# Patient Record
Sex: FEMALE | Race: WHITE | NOT HISPANIC OR LATINO | Employment: FULL TIME | URBAN - METROPOLITAN AREA
[De-identification: names, ages, dates, MRNs, and addresses within clinical notes are randomized per-mention and may not be internally consistent; named-entity substitution may affect disease eponyms.]

---

## 2019-05-06 ENCOUNTER — OFFICE VISIT (OUTPATIENT)
Dept: OBSTETRICS AND GYNECOLOGY | Facility: CLINIC | Age: 34
End: 2019-05-06

## 2019-05-06 DIAGNOSIS — R10.2 PELVIC PAIN IN FEMALE: ICD-10-CM

## 2019-05-06 DIAGNOSIS — N92.6 IRREGULAR MENSES: Primary | ICD-10-CM

## 2019-05-06 LAB
B-HCG UR QL: NEGATIVE
INTERNAL NEGATIVE CONTROL: NEGATIVE
INTERNAL POSITIVE CONTROL: POSITIVE
Lab: NORMAL

## 2019-05-06 PROCEDURE — 81025 URINE PREGNANCY TEST: CPT | Performed by: OBSTETRICS & GYNECOLOGY

## 2019-05-06 PROCEDURE — 99214 OFFICE O/P EST MOD 30 MIN: CPT | Performed by: OBSTETRICS & GYNECOLOGY

## 2019-05-06 RX ORDER — CETIRIZINE HYDROCHLORIDE 10 MG/1
10 TABLET ORAL DAILY
COMMUNITY
Start: 2018-04-08

## 2019-05-07 NOTE — PROGRESS NOTES
"Subjective   Meenu Kelly is a 34 y.o. female.     Cc:  Irregular period and cramping    History of Present Illness - Patient is a 34 year old multigravid female with irregular bleeding over the past cycle;  She had a 5 day cycle, stopped for one week and then bled for 3 more days.  The second bleeding episode was bright red and not characteristic of her normal cycle.  She was concerned because a family member of hers had \"cervical cancer.\"  She reports having a history of normal pap testing.  She has not had a work up for this issue.  She is not on any hormonal medications.    The following portions of the patient's history were reviewed and updated as appropriate:   She  has a past medical history of Asthma and Gestational diabetes.  She  has a past surgical history that includes [place] tooth extraction.  Her family history is not on file.  She  reports that she quit smoking about 5 years ago. She does not have any smokeless tobacco history on file. She reports that she does not drink alcohol or use drugs.  Current Outpatient Medications   Medication Sig Dispense Refill   • cetirizine (ZYRTEC ALLERGY) 10 MG tablet Take 10 mg by mouth Daily.       No current facility-administered medications for this visit.      She has No Known Allergies..    Review of Systems   Constitutional: Negative for chills and fever.   Genitourinary: Positive for menstrual problem and vaginal bleeding. Negative for pelvic pain.       Objective   Physical Exam   Constitutional: She appears well-developed and well-nourished.   Abdominal: Soft. There is no tenderness. There is no rebound and no guarding.   Genitourinary: Vagina normal and uterus normal. Pelvic exam was performed with patient supine. There is no tenderness or lesion on the right labia. There is no tenderness or lesion on the left labia. Cervix exhibits no motion tenderness and no friability. Right adnexum displays no mass and no tenderness. Left adnexum displays no mass and " no tenderness.   Neurological: She is alert. Coordination normal.   Skin: Skin is warm and dry.   Psychiatric: She has a normal mood and affect. Her behavior is normal. Judgment and thought content normal.   Vitals reviewed.      Assessment/Plan   Meenu was seen today for menstrual problem.    Diagnoses and all orders for this visit:    Irregular menses  -     POC Pregnancy, Urine  -     Patient with normal exam.  For now, would observe.  If recurrent pattern of abnormal bleeding occurs, I would consider work up, including sonogram and labs.      Pelvic pain in female  -     Urine Culture - Urine, Urine, Clean Catch  -     NuSwab VG+ - Swab, Vagina  -     Check for signs of UTI or infection and treat if appropriate.    I spent 25 minutes with patient and greater than 15 minutes in counseling face to face on above issues.    Roc Galicia MD

## 2019-05-08 VITALS
WEIGHT: 167 LBS | BODY MASS INDEX: 28.51 KG/M2 | HEIGHT: 64 IN | SYSTOLIC BLOOD PRESSURE: 129 MMHG | HEART RATE: 70 BPM | DIASTOLIC BLOOD PRESSURE: 78 MMHG

## 2019-05-09 LAB
BACTERIA UR CULT: NORMAL
BACTERIA UR CULT: NORMAL

## 2019-05-10 ENCOUNTER — TELEPHONE (OUTPATIENT)
Dept: OBSTETRICS AND GYNECOLOGY | Facility: CLINIC | Age: 34
End: 2019-05-10

## 2019-05-10 LAB
A VAGINAE DNA VAG QL NAA+PROBE: ABNORMAL SCORE
BVAB2 DNA VAG QL NAA+PROBE: ABNORMAL SCORE
C ALBICANS DNA VAG QL NAA+PROBE: NEGATIVE
C GLABRATA DNA VAG QL NAA+PROBE: NEGATIVE
C TRACH RRNA SPEC QL NAA+PROBE: NEGATIVE
MEGA1 DNA VAG QL NAA+PROBE: ABNORMAL SCORE
N GONORRHOEA RRNA SPEC QL NAA+PROBE: NEGATIVE
T VAGINALIS RRNA SPEC QL NAA+PROBE: NEGATIVE

## 2019-05-10 RX ORDER — METRONIDAZOLE 500 MG/1
500 TABLET ORAL 2 TIMES DAILY
Qty: 14 TABLET | Refills: 0 | Status: SHIPPED | OUTPATIENT
Start: 2019-05-10 | End: 2019-05-17

## 2019-05-10 NOTE — TELEPHONE ENCOUNTER
Kierra    Please let her know that she has a bacterial infection.  I called in antibiotics.    Thanks    Frida

## 2021-10-01 ENCOUNTER — OFFICE VISIT (OUTPATIENT)
Dept: OBSTETRICS AND GYNECOLOGY | Facility: CLINIC | Age: 36
End: 2021-10-01

## 2021-10-01 VITALS
WEIGHT: 171 LBS | BODY MASS INDEX: 28.49 KG/M2 | SYSTOLIC BLOOD PRESSURE: 108 MMHG | HEIGHT: 65 IN | DIASTOLIC BLOOD PRESSURE: 69 MMHG

## 2021-10-01 DIAGNOSIS — Z30.09 GENERAL COUNSELING AND ADVICE FOR CONTRACEPTIVE MANAGEMENT: ICD-10-CM

## 2021-10-01 DIAGNOSIS — Z01.419 VISIT FOR GYNECOLOGIC EXAMINATION: Primary | ICD-10-CM

## 2021-10-01 PROCEDURE — 99395 PREV VISIT EST AGE 18-39: CPT | Performed by: OBSTETRICS & GYNECOLOGY

## 2021-10-01 NOTE — PROGRESS NOTES
"[facility]  [address], [address]  [place], [place] [address]  Phone: [phone] / Fax:  [phone]      10/01/2021    [facility] [address] [facility] [address]    Umer Mulligan MD    Chief Complaint   Patient presents with   • Gynecologic Exam     Annual Exam, last pap unknown. Patient is interested in Tubal Ligation.       Meenu Kelly is here for annual gynecologic exam.  HPI - Patient with last pap within past 5 years.  She declines STD testing.  Her cycles are regular but moderately heavy and associated with moderate cramping.  She is interested in permanent sterilization; however, she feels that she would also like to pursue therapy for cycle control.    Past Medical History:   Diagnosis Date   • Asthma    • Gestational diabetes        Past Surgical History:   Procedure Laterality Date   • WISDOM TOOTH EXTRACTION         No Known Allergies    Social History     Socioeconomic History   • Marital status:      Spouse name: Not on file   • Number of children: Not on file   • Years of education: Not on file   • Highest education level: Not on file   Tobacco Use   • Smoking status: Former Smoker     Quit date: 2013     Years since quittin.2   Substance and Sexual Activity   • Alcohol use: No   • Drug use: No   • Sexual activity: Yes     Birth control/protection: None       Family History   Problem Relation Age of Onset   • No Known Problems Father    • Breast cancer Mother    • Lung cancer Paternal Grandmother    • Cervical cancer Maternal Grandmother        Patient's last menstrual period was 2021 (approximate).    OB History        3    Para   2    Term   2            AB   1    Living   2       SAB   1    TAB        Ectopic        Molar        Multiple        Live Births   2                Vitals:    10/01/21 1413   BP: 108/69   Weight: 77.6 kg (171 lb)   Height: 165.1 cm (65\")       Physical Exam  Constitutional:       Appearance: Normal appearance. She is " well-developed.   Genitourinary:      Pelvic exam was performed with patient in the lithotomy position.      Vulva, urethra, bladder, vagina and uterus normal.      No cervical motion tenderness or lesion.      Right adnexa not tender or full.      Left adnexa not tender or full.   HENT:      Right Ear: External ear normal.      Left Ear: External ear normal.      Nose: Nose normal.   Eyes:      Conjunctiva/sclera: Conjunctivae normal.   Neck:      Thyroid: No thyromegaly.   Cardiovascular:      Rate and Rhythm: Normal rate and regular rhythm.      Heart sounds: Normal heart sounds.   Pulmonary:      Effort: Pulmonary effort is normal.      Breath sounds: No stridor. No wheezing.   Chest:      Breasts:         Right: No mass or nipple discharge.         Left: No mass or nipple discharge.   Abdominal:      Palpations: Abdomen is soft. There is no mass.      Tenderness: There is no guarding or rebound.   Musculoskeletal:         General: Normal range of motion.      Cervical back: Normal range of motion and neck supple.   Neurological:      Mental Status: She is alert.      Coordination: Coordination normal.   Skin:     General: Skin is warm and dry.   Psychiatric:         Mood and Affect: Mood normal.         Behavior: Behavior normal.         Thought Content: Thought content normal.         Judgment: Judgment normal.   Vitals reviewed.         Diagnoses and all orders for this visit:    1. Visit for gynecologic examination (Primary)  -     IgP, Aptima HPV  -     Discussed importance of regular screening and breast awareness.    2. General counseling and advice for contraceptive management        -     Options discussed with patient, focusing primarily on sterilization and IUD.  I discussed laparoscopic tubal at length.  Discussed nature of procedure as well as risks which include but are not limited to bleeding, infection, injury to internal organs.  Permanence emphasized.  Discussed benefit of IUD including very  low pregnancy rate and cycle control.  Pamphlets given.  Patient to sign tubal consent.  If she wants to also pursue work up for bleeding in context of ablation with tubal sterilization, she should call back to have this scheduled.      Roc Galicia MD

## 2021-10-05 LAB
CYTOLOGIST CVX/VAG CYTO: NORMAL
CYTOLOGY CVX/VAG DOC CYTO: NORMAL
CYTOLOGY CVX/VAG DOC THIN PREP: NORMAL
DX ICD CODE: NORMAL
HIV 1 & 2 AB SER-IMP: NORMAL
HPV I/H RISK 4 DNA CVX QL PROBE+SIG AMP: NEGATIVE
OTHER STN SPEC: NORMAL
STAT OF ADQ CVX/VAG CYTO-IMP: NORMAL

## 2021-10-07 ENCOUNTER — TELEPHONE (OUTPATIENT)
Dept: OBSTETRICS AND GYNECOLOGY | Facility: CLINIC | Age: 36
End: 2021-10-07

## 2021-10-07 NOTE — TELEPHONE ENCOUNTER
Received device today, patient is scheduled for the 19th. 10-14-21/lw          Noted. Sent fax request for Benefit authorization. 10-7-21/lw

## 2021-10-07 NOTE — TELEPHONE ENCOUNTER
Mae    Does she want to do tubal or have an IUD?  If she wants tubal, I have to schedule that in OR.  Also, she talked about ablation for bleeding.  If she wants to coordinate that, she will need to return for testing first.    If she wants an IUD, you can add her to my schedule.    Frida

## 2021-10-07 NOTE — TELEPHONE ENCOUNTER
Pt called to report she would like to proceed with Mirena vs tubal.  She still has [place] PPO.  Wanted to make sure I am ok to schedule her 2 weeks out?    Please advise.     Pt # [phone]

## 2021-10-07 NOTE — TELEPHONE ENCOUNTER
"Sorry about that, I should have written \"instead of\".    Pt desires Mirena.   Scheduled for insertion on 10/19/21 (she has already been abstinent for about 1 week).     LAW, sending to you to make sure device is ordered.     Thanks!   Mae           "

## 2021-10-19 ENCOUNTER — PROCEDURE VISIT (OUTPATIENT)
Dept: OBSTETRICS AND GYNECOLOGY | Facility: CLINIC | Age: 36
End: 2021-10-19

## 2021-10-19 VITALS
SYSTOLIC BLOOD PRESSURE: 104 MMHG | BODY MASS INDEX: 28.49 KG/M2 | DIASTOLIC BLOOD PRESSURE: 70 MMHG | HEIGHT: 65 IN | WEIGHT: 171 LBS

## 2021-10-19 DIAGNOSIS — Z30.430 ENCOUNTER FOR IUD INSERTION: Primary | ICD-10-CM

## 2021-10-19 LAB
B-HCG UR QL: NEGATIVE
EXPIRATION DATE: NORMAL
INTERNAL NEGATIVE CONTROL: NEGATIVE
INTERNAL POSITIVE CONTROL: POSITIVE
Lab: NORMAL

## 2021-10-19 PROCEDURE — 81025 URINE PREGNANCY TEST: CPT | Performed by: OBSTETRICS & GYNECOLOGY

## 2021-10-19 PROCEDURE — 58300 INSERT INTRAUTERINE DEVICE: CPT | Performed by: OBSTETRICS & GYNECOLOGY

## 2021-10-19 RX ORDER — MEDROXYPROGESTERONE ACETATE 150 MG/ML
150 INJECTION, SUSPENSION INTRAMUSCULAR ONCE
Status: SHIPPED | OUTPATIENT
Start: 2021-10-19

## 2021-10-19 NOTE — PROGRESS NOTES
IUD Insertion Procedure Note    Indication: Desires long acting reversible contraception     Procedure Details   Urine pregnancy test was done and was NEGATIVE .  The risks (including infection, bleeding, pain, and uterine perforation) and benefits of the procedure were explained to the patient and Verbal informed consent was obtained.      Cervix cleansed with Betadine. Uterus sounded to 7 cm. IUD inserted without difficulty. String visible and trimmed.    IUD Information:  Mirena, Lot # [id number], Expiration date 2/2024, NDC 79121-528-18.    Condition:  Stable    Complications:  None    Patient tolerated the procedure well without complications.    Plan:    The patient was advised to call for any fever or for prolonged or severe pain or bleeding. She was advised to use NSAID as needed for mild to moderate pain.   Patient to avoid intercourse for one week.  Follow up in 4 weeks for IUD check.    Roc Galicia MD  10/19/2021  11:24 EDT

## 2021-10-21 ENCOUNTER — TELEPHONE (OUTPATIENT)
Dept: OBSTETRICS AND GYNECOLOGY | Facility: CLINIC | Age: 36
End: 2021-10-21

## 2021-10-21 NOTE — TELEPHONE ENCOUNTER
Authorization for IUD started   Hi Dr. Galicia,  Pt called to report that she can feel the wire from her Mirena. Is also still spotting when she wipes. No cramping or pain. Please advise.

## 2021-10-25 NOTE — TELEPHONE ENCOUNTER
Attempted to call pt this morning to see if she is still needing to be seen. If so, Mary said that she could be worked into the [address] at 10:30 today.

## 2021-10-28 ENCOUNTER — TELEPHONE (OUTPATIENT)
Dept: OBSTETRICS AND GYNECOLOGY | Facility: CLINIC | Age: 36
End: 2021-10-28

## 2021-11-02 NOTE — TELEPHONE ENCOUNTER
Patient is aware and has no further questions.  Patient also has stated that she hs stopped bleeding, but still has some spotting.

## 2021-11-30 ENCOUNTER — OFFICE VISIT (OUTPATIENT)
Dept: OBSTETRICS AND GYNECOLOGY | Facility: CLINIC | Age: 36
End: 2021-11-30

## 2021-11-30 VITALS
BODY MASS INDEX: 28.16 KG/M2 | SYSTOLIC BLOOD PRESSURE: 117 MMHG | WEIGHT: 169 LBS | HEIGHT: 65 IN | DIASTOLIC BLOOD PRESSURE: 73 MMHG

## 2021-11-30 DIAGNOSIS — T83.9XXS COMPLICATION OF INTRAUTERINE DEVICE (IUD), UNSPECIFIED COMPLICATION, SEQUELA: Primary | ICD-10-CM

## 2021-11-30 DIAGNOSIS — N89.8 VAGINAL DISCHARGE: ICD-10-CM

## 2021-11-30 PROCEDURE — 99213 OFFICE O/P EST LOW 20 MIN: CPT | Performed by: OBSTETRICS & GYNECOLOGY

## 2021-11-30 NOTE — PROGRESS NOTES
Subjective   Meenu Kelly is a 36 y.o. female.     Cc:  IUD expulsion and discharge    History of Present Illness - Patient is a 36 year old female who presents for follow up.  A Mirena IUD was placed in October.  Placement was uncomplicated and patient did well.  However, 5 days ago, patient developed bleeding and vaginal discharge with odor.  Subsequently, patient noted that IUD fell out.  Her bleeding tapered off and she still has some odor.  She reports mild vaginal pain.  Patient was using IUD for contraception and cycle control.    The following portions of the patient's history were reviewed and updated as appropriate:   She  has a past medical history of Asthma and Gestational diabetes.  She  reports that she quit smoking about 8 years ago. She does not have any smokeless tobacco history on file. She reports that she does not drink alcohol and does not use drugs.  Current Outpatient Medications   Medication Sig Dispense Refill   • cetirizine (ZYRTEC ALLERGY) 10 MG tablet Take 10 mg by mouth Daily.     • levonorgestrel (MIRENA) 20 MCG/24HR IUD To be inserted one time by prescriber. 1 each 0     Current Facility-Administered Medications   Medication Dose Route Frequency Provider Last Rate Last Admin   • medroxyPROGESTERone (DEPO-PROVERA) injection 150 mg  150 mg Intramuscular Once Roc Galicia MD         She has No Known Allergies..    Review of Systems   Constitutional: Negative for chills and fever.   Genitourinary: Positive for vaginal bleeding and vaginal discharge.       Objective   Physical Exam  Vitals reviewed. Exam conducted with a chaperone present.   Constitutional:       Appearance: Normal appearance.   Genitourinary:     General: Normal vulva.      Exam position: Lithotomy position.      Labia:         Right: No rash or tenderness.         Left: No rash or tenderness.       Urethra: No urethral pain or urethral lesion.      Vagina: Normal.      Cervix: Normal.      Uterus: Normal.        Adnexa: Right adnexa normal and left adnexa normal.   Neurological:      Mental Status: She is alert.   Psychiatric:         Mood and Affect: Mood normal.         Behavior: Behavior normal.         Thought Content: Thought content normal.         Judgment: Judgment normal.         Assessment/Plan   Diagnoses and all orders for this visit:    1. Complication of intrauterine device (IUD), unspecified complication, sequela (Primary)        -      Discussed options.  Previously, patient considered tubal sterilization with ablation; therefore, this was revisited.  However, patient interested in another attempt at IUD placement.  Recommended performing under ultrasound.  Will work on preauthorization.  2. Vaginal discharge  -     NuSwab VG+ - Swab, Vagina  -     Await swab.         Roc Galicia MD

## 2021-12-02 ENCOUNTER — TELEPHONE (OUTPATIENT)
Dept: OBSTETRICS AND GYNECOLOGY | Facility: CLINIC | Age: 36
End: 2021-12-02

## 2021-12-02 LAB
A VAGINAE DNA VAG QL NAA+PROBE: ABNORMAL SCORE
BVAB2 DNA VAG QL NAA+PROBE: ABNORMAL SCORE
C ALBICANS DNA VAG QL NAA+PROBE: NEGATIVE
C GLABRATA DNA VAG QL NAA+PROBE: NEGATIVE
C TRACH DNA VAG QL NAA+PROBE: NEGATIVE
MEGA1 DNA VAG QL NAA+PROBE: ABNORMAL SCORE
N GONORRHOEA DNA VAG QL NAA+PROBE: NEGATIVE
T VAGINALIS DNA VAG QL NAA+PROBE: NEGATIVE

## 2021-12-02 RX ORDER — METRONIDAZOLE 500 MG/1
500 TABLET ORAL 2 TIMES DAILY
Qty: 14 TABLET | Refills: 0 | Status: SHIPPED | OUTPATIENT
Start: 2021-12-02 | End: 2021-12-09

## 2021-12-13 ENCOUNTER — PROCEDURE VISIT (OUTPATIENT)
Dept: OBSTETRICS AND GYNECOLOGY | Facility: CLINIC | Age: 36
End: 2021-12-13

## 2021-12-13 VITALS
SYSTOLIC BLOOD PRESSURE: 121 MMHG | HEIGHT: 65 IN | DIASTOLIC BLOOD PRESSURE: 77 MMHG | WEIGHT: 168 LBS | BODY MASS INDEX: 27.99 KG/M2

## 2021-12-13 DIAGNOSIS — Z30.430 ENCOUNTER FOR INSERTION OF MIRENA IUD: Primary | ICD-10-CM

## 2021-12-13 PROCEDURE — 58300 INSERT INTRAUTERINE DEVICE: CPT | Performed by: OBSTETRICS & GYNECOLOGY

## 2021-12-13 PROCEDURE — 81025 URINE PREGNANCY TEST: CPT | Performed by: OBSTETRICS & GYNECOLOGY

## 2021-12-13 NOTE — PROGRESS NOTES
IUD Insertion Procedure Note    Indication: Desires long acting reversible contraception     Procedure Details   Urine pregnancy test was done and was NEGATIVE .  The risks (including infection, bleeding, pain, and uterine perforation) and benefits of the procedure were explained to the patient and Verbal informed consent was obtained.      Ultrasound used to guide placement due to expulsion of previous IUD.    Cervix cleansed with Betadine. Uterus sounded to 8 cm. IUD inserted without difficulty. String visible and trimmed.    IUD Information:  Mirena, Lot # [id number], Expiration date 2/2024, NDC 67165-794-47.    Condition:  Stable    Complications:  None    Patient tolerated the procedure well without complications.    Plan:    The patient was advised to call for any fever or for prolonged or severe pain or bleeding. She was advised to use NSAID as needed for mild to moderate pain.   Patient to refrain from intercourse for one week.  IUD check in one month.    Roc Galicia MD  12/13/2021  15:34 EST

## 2022-01-11 ENCOUNTER — OFFICE VISIT (OUTPATIENT)
Dept: OBSTETRICS AND GYNECOLOGY | Facility: CLINIC | Age: 37
End: 2022-01-11

## 2022-01-11 VITALS
WEIGHT: 168 LBS | BODY MASS INDEX: 27.99 KG/M2 | SYSTOLIC BLOOD PRESSURE: 111 MMHG | DIASTOLIC BLOOD PRESSURE: 69 MMHG | HEIGHT: 65 IN

## 2022-01-11 DIAGNOSIS — Z30.431 IUD CHECK UP: Primary | ICD-10-CM

## 2022-01-11 PROCEDURE — 99212 OFFICE O/P EST SF 10 MIN: CPT | Performed by: OBSTETRICS & GYNECOLOGY

## 2022-01-11 NOTE — PROGRESS NOTES
Subjective   Meenu Kelly is a 37 y.o. female.     Cc:  IUD check    History of Present Illness - 37 year old female who had Mirena IUD placed 4 weeks ago.  She has had a period but it was light.  She denies pelvic pain, bleeding or IUD expulsion.    The following portions of the patient's history were reviewed and updated as appropriate:   She  has a past medical history of Asthma and Gestational diabetes.  She  reports that she quit smoking about 8 years ago. She does not have any smokeless tobacco history on file. She reports that she does not drink alcohol and does not use drugs.  Current Outpatient Medications   Medication Sig Dispense Refill   • cetirizine (ZYRTEC ALLERGY) 10 MG tablet Take 10 mg by mouth Daily.     • levonorgestrel (MIRENA) 20 MCG/24HR IUD To be inserted one time by prescriber. 1 each 0     Current Facility-Administered Medications   Medication Dose Route Frequency Provider Last Rate Last Admin   • medroxyPROGESTERone (DEPO-PROVERA) injection 150 mg  150 mg Intramuscular Once Roc Galicia MD         She has No Known Allergies..    Review of Systems   Constitutional: Negative for chills and fever.   Gastrointestinal: Negative for abdominal pain.   Genitourinary: Negative for pelvic pain.       Objective   Physical Exam  Vitals reviewed. Exam conducted with a chaperone present.   Constitutional:       Appearance: Normal appearance.   Genitourinary:     General: Normal vulva.      Exam position: Lithotomy position.      Labia:         Right: No rash or tenderness.         Left: No rash or tenderness.       Urethra: No prolapse or urethral swelling.      Vagina: Normal.      Cervix: Normal.      Comments: IUD strings noted  Neurological:      Mental Status: She is alert.   Psychiatric:         Mood and Affect: Mood normal.         Behavior: Behavior normal.         Thought Content: Thought content normal.         Judgment: Judgment normal.         Assessment/Plan   Diagnoses and all  orders for this visit:    1. IUD check up (Primary)  -  Reassurance given.  Follow up in one year.    Roc Galicia MD

## 2022-05-03 ENCOUNTER — TELEPHONE (OUTPATIENT)
Dept: OBSTETRICS AND GYNECOLOGY | Facility: CLINIC | Age: 37
End: 2022-05-03

## 2022-05-03 NOTE — TELEPHONE ENCOUNTER
Patient calling- has had irregular bleeding.  She had a regular period, and has been kind of spotting for the past 2 weeks. She can still feel her IUD string, but had issue with a past IUD so she wanted to check if everything is ok.  She is off 5/6 and 5/19- but cant take the opening you have on 5/19.  I did let her know it can take about 6 months to regulate.  Do you want me to try and fit her in on 5/6- or have her see Martha, or do you think shes ok?    Thanks, Anabel

## 2022-05-06 ENCOUNTER — OFFICE VISIT (OUTPATIENT)
Dept: OBSTETRICS AND GYNECOLOGY | Facility: CLINIC | Age: 37
End: 2022-05-06

## 2022-05-06 VITALS
WEIGHT: 174 LBS | SYSTOLIC BLOOD PRESSURE: 120 MMHG | DIASTOLIC BLOOD PRESSURE: 70 MMHG | BODY MASS INDEX: 28.99 KG/M2 | HEIGHT: 65 IN

## 2022-05-06 DIAGNOSIS — N89.8 VAGINAL DISCHARGE: Primary | ICD-10-CM

## 2022-05-06 DIAGNOSIS — R35.0 URINARY FREQUENCY: ICD-10-CM

## 2022-05-06 DIAGNOSIS — N93.8 DYSFUNCTIONAL UTERINE BLEEDING: ICD-10-CM

## 2022-05-06 LAB
BILIRUB BLD-MCNC: NEGATIVE MG/DL
CLARITY, POC: CLEAR
COLOR UR: YELLOW
GLUCOSE UR STRIP-MCNC: NEGATIVE MG/DL
KETONES UR QL: NEGATIVE
LEUKOCYTE EST, POC: NEGATIVE
NITRITE UR-MCNC: NEGATIVE MG/ML
PH UR: 7.5 [PH] (ref 5–8)
PROT UR STRIP-MCNC: NEGATIVE MG/DL
RBC # UR STRIP: ABNORMAL /UL
SP GR UR: 1.02 (ref 1–1.03)
UROBILINOGEN UR QL: NORMAL

## 2022-05-06 PROCEDURE — 81002 URINALYSIS NONAUTO W/O SCOPE: CPT | Performed by: OBSTETRICS & GYNECOLOGY

## 2022-05-06 PROCEDURE — 99213 OFFICE O/P EST LOW 20 MIN: CPT | Performed by: OBSTETRICS & GYNECOLOGY

## 2022-05-08 LAB
BACTERIA UR CULT: NORMAL
BACTERIA UR CULT: NORMAL

## 2022-05-09 LAB
A VAGINAE DNA VAG QL NAA+PROBE: NORMAL SCORE
BVAB2 DNA VAG QL NAA+PROBE: NORMAL SCORE
C ALBICANS DNA VAG QL NAA+PROBE: NEGATIVE
C GLABRATA DNA VAG QL NAA+PROBE: NEGATIVE
C TRACH DNA VAG QL NAA+PROBE: NEGATIVE
MEGA1 DNA VAG QL NAA+PROBE: NORMAL SCORE
N GONORRHOEA DNA VAG QL NAA+PROBE: NEGATIVE
T VAGINALIS DNA VAG QL NAA+PROBE: NEGATIVE

## 2022-05-09 NOTE — PROGRESS NOTES
Subjective   Meenu Kelly is a 37 y.o. female.     Cc:  Irregular bleeding and vaginal discharge.    History of Present Illness - Patient is a 37 year old female who presents with irregular bleeding and vaginal discharge.  Patient has IUD in place and reports spotting.  She also has some vaginal odor.  In addition, she reports urinary frequency.    The following portions of the patient's history were reviewed and updated as appropriate:   She  has a past medical history of Asthma and Gestational diabetes.  She  reports that she quit smoking about 8 years ago. She does not have any smokeless tobacco history on file. She reports that she does not drink alcohol and does not use drugs.  Current Outpatient Medications   Medication Sig Dispense Refill   • cetirizine (zyrTEC) 10 MG tablet Take 10 mg by mouth Daily.     • levonorgestrel (MIRENA) 20 MCG/24HR IUD To be inserted one time by prescriber. 1 each 0     Current Facility-Administered Medications   Medication Dose Route Frequency Provider Last Rate Last Admin   • medroxyPROGESTERone (DEPO-PROVERA) injection 150 mg  150 mg Intramuscular Once Roc Galicia MD         She has No Known Allergies..    Review of Systems   Constitutional: Negative for chills and fever.   Genitourinary: Positive for frequency, vaginal bleeding and vaginal discharge.       Objective   Physical Exam  Vitals reviewed. Exam conducted with a chaperone present.   Constitutional:       Appearance: Normal appearance.   Genitourinary:     General: Normal vulva.      Exam position: Lithotomy position.      Labia:         Right: No rash or tenderness.         Left: No rash or tenderness.       Vagina: Vaginal discharge present.      Cervix: Normal.      Uterus: Normal.       Adnexa: Right adnexa normal.      Comments: IUD strings noted.  Neurological:      Mental Status: She is alert.   Psychiatric:         Mood and Affect: Mood normal.         Behavior: Behavior normal.         Thought  Content: Thought content normal.         Judgment: Judgment normal.         Assessment/Plan   Diagnoses and all orders for this visit:    1. Vaginal discharge (Primary)  -     NuSwab VG+ - Swab, Vagina  -     Urine Culture - Urine, Urine, Clean Catch  -     Await cultures.    2. Urinary frequency  -     POC Urinalysis Dipstick  -     Dipstick negative.    3. Dysfunctional uterine bleeding        -     Reassurance given.

## 2022-05-11 ENCOUNTER — TELEPHONE (OUTPATIENT)
Dept: OBSTETRICS AND GYNECOLOGY | Facility: CLINIC | Age: 37
End: 2022-05-11

## 2022-05-11 RX ORDER — METRONIDAZOLE 500 MG/1
500 TABLET ORAL 2 TIMES DAILY
Qty: 14 TABLET | Refills: 0 | Status: SHIPPED | OUTPATIENT
Start: 2022-05-11 | End: 2022-05-18

## 2022-05-11 NOTE — TELEPHONE ENCOUNTER
Tasia    Let her know that her vaginal culture was somewhat equivocal for BV.  It was negative for all other infections.  If she wants to do a round of antibiotics, I called in Flagyl.    Thanks    Frida

## 2022-05-11 NOTE — TELEPHONE ENCOUNTER
Left message for Meenu that Dr Galicia wanted to let her know that your vaginal culture was somewhat equivocal for BV. It was negative for all other infections. If you want to do a round of antibiotics, Dr Galicia called in flagyl to your [facility] on file. The flagyl can leave a metallic taste in your mouth and you should not drink alcohol while taking Flagyl, as it can make you really sick.    Meenu called back while I was typing this note, so she is aware.

## 2024-09-10 ENCOUNTER — OFFICE VISIT (OUTPATIENT)
Dept: OBSTETRICS AND GYNECOLOGY | Facility: CLINIC | Age: 39
End: 2024-09-10
Payer: COMMERCIAL

## 2024-09-10 VITALS
HEIGHT: 64 IN | DIASTOLIC BLOOD PRESSURE: 66 MMHG | SYSTOLIC BLOOD PRESSURE: 98 MMHG | BODY MASS INDEX: 24.41 KG/M2 | WEIGHT: 143 LBS

## 2024-09-10 DIAGNOSIS — Z01.419 VISIT FOR GYNECOLOGIC EXAMINATION: Primary | ICD-10-CM

## 2024-09-10 PROCEDURE — 99396 PREV VISIT EST AGE 40-64: CPT | Performed by: OBSTETRICS & GYNECOLOGY

## 2024-09-10 NOTE — PROGRESS NOTES
"[facility]  [address], [address]  [place], [place] [address]  Phone: [phone] / Fax:  [phone]      09/10/2024    [facility] [address] [facility] [address]    Umer Mulligan MD    Chief Complaint   Patient presents with    Gynecologic Exam     Annual Exam, last pap 10-1-21- NL HPV (-). Patient with Mirena IUD in place since 10-19-21. Patient has never had a mammogram.       Meenu Kelly is here for annual gynecologic exam.  HPI - Patient with normal pap 3 years ago.  She has had Mirena IUD in place for 3 years and has scant bleeding.  She has never had a mammogram.    Past Medical History:   Diagnosis Date    Asthma     Gestational diabetes        Past Surgical History:   Procedure Laterality Date    WISDOM TOOTH EXTRACTION         No Known Allergies    Social History     Socioeconomic History    Marital status:    Tobacco Use    Smoking status: Former     Current packs/day: 0.00     Types: Cigarettes     Quit date: 2013     Years since quittin.1   Vaping Use    Vaping status: Never Used   Substance and Sexual Activity    Alcohol use: No    Drug use: No    Sexual activity: Yes     Birth control/protection: I.U.D.       Family History   Problem Relation Age of Onset    No Known Problems Father     Breast cancer Mother     Lung cancer Paternal Grandmother     Cervical cancer Maternal Grandmother     Colon cancer Neg Hx        No LMP recorded (lmp unknown). Patient has had an implant.    OB History          3    Para   2    Term   2            AB   1    Living   2         SAB   1    IAB        Ectopic        Molar        Multiple        Live Births   2                Vitals:    09/10/24 1105   BP: 98/66   Weight: 64.9 kg (143 lb)   Height: 162.6 cm (64\")       Physical Exam  Constitutional:       Appearance: Normal appearance. She is well-developed.   Genitourinary:      Bladder and urethral meatus normal.      Right Labia: No tenderness or lesions.     Left " Labia: No tenderness or lesions.     No vaginal discharge or tenderness.        Right Adnexa: not tender and not full.     Left Adnexa: not tender and not full.     No cervical motion tenderness or lesion.      IUD strings visualized.      Uterus is not enlarged or tender.      No urethral tenderness or hypermobility present.   Breasts:     Right: No mass or nipple discharge.      Left: No mass or nipple discharge.   HENT:      Right Ear: External ear normal.      Left Ear: External ear normal.      Nose: Nose normal.   Eyes:      Conjunctiva/sclera: Conjunctivae normal.   Neck:      Thyroid: No thyromegaly.   Cardiovascular:      Rate and Rhythm: Normal rate and regular rhythm.      Heart sounds: Normal heart sounds.   Pulmonary:      Effort: Pulmonary effort is normal.      Breath sounds: No stridor. No wheezing.   Abdominal:      Palpations: Abdomen is soft.      Tenderness: There is no abdominal tenderness. There is no guarding or rebound.   Musculoskeletal:         General: Normal range of motion.      Cervical back: Normal range of motion and neck supple.   Neurological:      Mental Status: She is alert.      Coordination: Coordination normal.   Skin:     General: Skin is warm and dry.   Psychiatric:         Mood and Affect: Mood normal.         Behavior: Behavior normal.         Thought Content: Thought content normal.         Judgment: Judgment normal.   Vitals reviewed. Exam conducted with a chaperone present.         Diagnoses and all orders for this visit:    1. Visit for gynecologic examination (Primary)  -     IgP, Aptima HPV  -     Discussed importance of regular screening and breast awareness.        Roc Galicia MD

## 2024-09-12 LAB
CYTOLOGIST CVX/VAG CYTO: NORMAL
CYTOLOGY CVX/VAG DOC CYTO: NORMAL
CYTOLOGY CVX/VAG DOC THIN PREP: NORMAL
DX ICD CODE: NORMAL
HPV I/H RISK 4 DNA CVX QL PROBE+SIG AMP: NEGATIVE
Lab: NORMAL
OTHER STN SPEC: NORMAL
STAT OF ADQ CVX/VAG CYTO-IMP: NORMAL

## 2024-09-13 ENCOUNTER — TELEPHONE (OUTPATIENT)
Dept: OBSTETRICS AND GYNECOLOGY | Facility: CLINIC | Age: 39
End: 2024-09-13
Payer: COMMERCIAL